# Patient Record
(demographics unavailable — no encounter records)

---

## 2024-10-15 NOTE — PHYSICAL EXAM
[FreeTextEntry1] : He is a 57 year old male in NAD  no erythema, warmth, swelling; shoulder hunched forward abd to 170 deg, ER to 90  Constitutional: healthy appearing, NAD, and normal body habitus  LUMBAR ROM: flexion to 30 deg, ext to 5 deg  Gait: normal  Inspection: no erythema, warmth Spine: no TTP in spinous process Bony palpation: no TTP in GT  Soft tissue palpation hip: no TTP in gluteus vin Soft tissue palpation of spine: no TTP in lumbar paraspinals  5/5 bilateral KE, DF, PF  sensation intact in bilat LE reflexes: ankle 2+  Special tests: neg seated SLR

## 2024-10-15 NOTE — ASSESSMENT
[FreeTextEntry1] : shoulder -  Educated that he needed to do HEP regularly.    lumbar - Discussed diagnosis and treatment plan including PT. Educated to do HEP regularly even if pain improves. lift things properly throw away old meds

## 2024-10-15 NOTE — DATA REVIEWED
[FreeTextEntry1] : office xrays of lumbar spine ap and lateral show mild scoliosis, mild DDD L3-S1, L4-S1 NF stenosis.

## 2024-10-15 NOTE — HISTORY OF PRESENT ILLNESS
[FreeTextEntry1] : Right shoulder is doing well.  Severity: 0/10 Duration: years Context: atraumatic Aggravating Factors: lifting arm Alleviating Factors: rest, injection Associated Symptoms: denies weight loss, fever, chills, change in bowel/bladder habits, weakness, numbness/tingling, radiation down arm Prior Studies: xray 10/2019 right subacromial injection - significant relief 2/2020 right   Location: back Severity: 5/10, worse lately Duration: years Context: atraumatic Aggravating Factors: sitting, bending Alleviating Factors: fetal position, FRANCO Associated Symptoms: denies weight loss, fever, chills, change in bowel/bladder habits, +weakness, +tingling, +radiation down left leg Prior Studies: EMG LE  Tried PT on [address] 6/2019 right L4 and L5 FRANCO - over 80% relief in thigh 7/2019 right L4 and L5 FRANCO - over 50% relief for 5 yr, less leg pain

## 2024-10-25 NOTE — PHYSICAL EXAM
[FreeTextEntry1] : He is a 57 year old male in NAD  no erythema, warmth, swelling; shoulder hunched forward abd to 170 deg, ER to 90  Constitutional: healthy appearing, NAD, and normal body habitus  LUMBAR ROM: flexion to 30 deg, ext to 5 deg  Gait: antalgic  Inspection: no erythema, warmth Spine: no TTP in spinous process Bony palpation: no TTP in GT  Soft tissue palpation hip: no TTP in gluteus vin Soft tissue palpation of spine: no TTP in lumbar paraspinals  5/5 bilateral PF and right KE, DF, 4 left KE, 4+ left DF sensation intact in bilat LE except decreased in left anterior leg knee reflex 1+ bilat  Special tests: neg seated SLR

## 2024-10-25 NOTE — HISTORY OF PRESENT ILLNESS
[FreeTextEntry1] : Location: back Severity: 8/10, worse lately Duration: years Context: atraumatic Aggravating Factors: sitting, bending Alleviating Factors: fetal position, FRANCO Associated Symptoms: denies weight loss, fever, chills, change in bowel/bladder habits, +weakness, +tingling, +radiation down left leg Prior Studies: EMG LE.  MRI 2024  Tried PT on [address] 6/2019 right L4 and L5 FRANCO - over 80% relief in thigh 7/2019 right L4 and L5 FRANCO - over 50% relief for 5 yr, less leg pain

## 2024-10-25 NOTE — ASSESSMENT
[FreeTextEntry1] : MRI report not back.  Images shows L4/5 and L5/S1 disc bulges and lateral recess stenosis.  lumbar - Discussed diagnosis and treatment plan including PT. Educated to do HEP regularly even if pain improves. lift things properly get up often on flight will give medrol in case he really needs it on trip and cannot get FRANCO before Tues.  will limit narcotics and use mostly for trip He has tried conservative tx including PT, nsaids for past 3 months without relief. Discussed FRANCO given weakness.  Risks include bleeding.    He will continue a comprehensive rehabilitation program afterward, as this is important to prevent recurrence of pain. increase gabapentin qhs to 2; still take 1 qam

## 2024-10-28 NOTE — PROCEDURE
[de-identified] : indication: pain       Fluoroscopically Guided, Contrast Enhanced, Left L4 and L5 Epidural Steroid Injection       After informed consent, He was placed prone on the fluoroscopy table. Skin over the area was prepped and draped in the usual sterile fashion before being anesthetized with 1% Lidocaine. Then using an oblique approach, a 22 gauge 5 in spinal needle was directed down to the L5/S1 NF.   Needle placement was confirmed with AP fluoroscopic images. After negative aspiration for blood or cerebrospinal fluid, contrast was instilled under live fluoroscopy and an epidurogram was obtained. It showed good epidural flow without any vascular uptake. Then a steroid solution consisting of 20 mg of Kenalog, 1 ml of 0.5% Lidocaine was instilled. The same procedure was repeated at the left L4/5 NF .       He  was discharged in good condition.  Instructions given: No soaking or bathing for 2 days but can take a shower.  No strenuous exercise for 4 days.       Manufacture [employer] Omnipaque 180 NDC 602147194 Exp 12/15/25 [id number]         Lidocaine Manufacture [employer] NDC 43466-266-01 Exp 9/25 LOT [id number]       Triamcinolone NDC [address]-1 Exp 11/25 Lot [id number] Manufacture [employer]

## 2024-10-28 NOTE — ASSESSMENT
[FreeTextEntry1] : He understands risk of immunosuppression from steroids.  Do not take medrol unless pain is severe.  It has been manageable with Tyl #3.  He had some immediate relief.  f/u after trip

## 2024-11-25 NOTE — ASSESSMENT
[FreeTextEntry1] : Taught DF ankle exercises.  He understands risk of immunosuppression from steroids.  Do not get covid vaccine for 2 wk.  f/u 2 wk.  He had some immediate relief.

## 2024-11-25 NOTE — PROCEDURE
[de-identified] : indication: pain       Fluoroscopically Guided, Contrast Enhanced, Left L4 and L5 Epidural Steroid Injection       After informed consent, He was placed prone on the fluoroscopy table. Skin over the area was prepped and draped in the usual sterile fashion before being anesthetized with 1% Lidocaine. Then using an oblique approach, a 22 gauge 5 in spinal needle was directed down to the L5/S1 NF.   Needle placement was confirmed with AP fluoroscopic images. After negative aspiration for blood or cerebrospinal fluid, contrast was instilled under live fluoroscopy and an epidurogram was obtained. It showed good epidural flow without any vascular uptake. Then a steroid solution consisting of 30 mg of Kenalog, 1 ml of 0.5% Lidocaine was instilled. The same procedure was repeated at the L4/5 NF.       He  was discharged in good condition.  Instructions given: No soaking or bathing for 2 days but can take a shower.  No strenuous exercise for 4 days.       Manufacture [employer] Omnipaque 180 NDC [id number] Exp 12/15/25 [id number]         Lidocaine Manufacture [employer] NDC 45623-627-04 Exp 9/25 LOT [id number]       Triamcinolone NDC [address]-9 Exp 11/25 Lot [id number] Manufacture [employer]

## 2024-12-12 NOTE — HISTORY OF PRESENT ILLNESS
[FreeTextEntry1] : Location: back Severity: 3/10 now, improving but still painful in afternoon.  Taking up to 5000 mg tylenol a day.  Duration: years Context: atraumatic Aggravating Factors: sitting, bending Alleviating Factors: fetal position, FRANCO Associated Symptoms: denies weight loss, fever, chills, change in bowel/bladder habits, +weakness, +numbness, +radiation down left leg Prior Studies: EMG LE.  MRI 2024  Tried PT on [address] 6/2019 right L4 and L5 FRANCO - over 80% relief in thigh 7/2019 right L4 and L5 FRANCO - over 50% relief for 5 yr, less leg pain 10/2024 left L4 and L5 FRANCO - over 50% relief, less numbness, off narcotics 11/2024 left L4 and L5 FRANCO -

## 2024-12-12 NOTE — ASSESSMENT
[FreeTextEntry1] : MRI show L4/5 recess stenosis and mild left NF stenosis, moderate left NF and recess stenosis at L5/S1.    lumbar - Discussed diagnosis and treatment plan including PT. Educated to do HEP regularly even if pain improves. lift things properly Another FRANCO needed given numbness and weakness.  Discussed surgery do ankle DF and KE to failure taking too much tylenol.  Educated on max dose of 3000 mg per day.  continue gabapentin.  Cannot increase due to sedation try swimming or aquatic therapy since has pool.  Try biking.  try acupuncture

## 2024-12-12 NOTE — PHYSICAL EXAM
[FreeTextEntry1] : He is a 57 year old male in NAD   Constitutional: healthy appearing, NAD, and normal body habitus  LUMBAR ROM: flexion to 30 deg, ext to 5 deg  Gait: antalgic, slow, small steps, poor balance  Inspection: no erythema, warmth Spine: no TTP in spinous process Bony palpation: no TTP in GT  Soft tissue palpation hip: no TTP in gluteus vin Soft tissue palpation of spine: no TTP in lumbar paraspinals  5/5 bilateral PF and right KE, DF, 4+ left KE, 4+ left DF sensation intact in bilat LE except decreased in left anterior and lateral leg  Special tests: neg seated SLR

## 2025-01-15 NOTE — HISTORY OF PRESENT ILLNESS
[FreeTextEntry1] : Location: back Severity: 6/10, painful in afternoon.   Duration: years Context: atraumatic Aggravating Factors: sitting, bending, walking Alleviating Factors: fetal position, FRANCO Associated Symptoms: denies weight loss, fever, chills, change in bowel/bladder habits, +weakness and tripping when walking, +numbness both legs now, +radiation down left leg and now right Prior Studies: EMG LE.  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 6/2019 right L4 and L5 FRANCO - over 80% relief in thigh 7/2019 right L4 and L5 FRANCO - over 50% relief for 5 yr, less leg pain 10/2024 left L4 and L5 FRANCO - over 50% relief, less numbness, off narcotics 11/2024 left L4 and L5 FRANCO - over 50% relief in am

## 2025-01-15 NOTE — PHYSICAL EXAM
[FreeTextEntry1] : He is a 57 year old male in NAD   Constitutional: healthy appearing, NAD, and normal body habitus  LUMBAR ROM: flexion to 30 deg, ext to 5 deg  Gait: antalgic, slow, small steps, poor balance  Inspection: no erythema, warmth Spine: no TTP in spinous process Bony palpation: no TTP in GT  Soft tissue palpation hip: no TTP in gluteus ivn Soft tissue palpation of spine: no TTP in lumbar paraspinals  5/5 bilateral PF and left KE, 5- right KE,  right DF 4, left 4+ sensation intact in bilat LE except decreased in left anterior and lateral leg  Special tests: neg seated SLR

## 2025-01-15 NOTE — ASSESSMENT
[FreeTextEntry1] : MRI show L4/5 recess stenosis and mild left NF stenosis, moderate left NF and recess stenosis at L5/S1.    lumbar - Discussed diagnosis and treatment plan including PT. Educated to do HEP regularly even if pain improves. lift things properly Another FRANCO needed given numbness and weakness.  Discussed surgery if not better.  do ankle DF and KE to failure Still taking too much tylenol.  Educated on max dose of 3000 mg per day. Will try tramadol next try horizant instead of gabapentin next 2 nights continue swimming or aquatic therapy since has pool.  Try biking.  try acupuncture

## 2025-03-17 NOTE — PROCEDURE
[de-identified] : indication: pain       Fluoroscopically Guided, Contrast Enhanced, Left L4 Epidural Steroid Injection       After informed consent, He was placed prone on the fluoroscopy table. Skin over the area was prepped and draped in the usual sterile fashion before being anesthetized with 1% Lidocaine. Then using an oblique approach, a 22 gauge 5 in spinal needle was directed down to the L4/5 NF.   Needle placement was confirmed with AP fluoroscopic images. After negative aspiration for blood or cerebrospinal fluid, contrast was instilled under live fluoroscopy and an epidurogram was obtained. It showed good epidural flow without any vascular uptake. Then a steroid solution consisting of 40 mg of Kenalog, 1 ml of 0.5% Lidocaine was instilled.        He  was discharged in good condition.  Instructions given: No soaking or bathing for 2 days but can take a shower.  No strenuous exercise for 4 days.       Manufacture [employer] Omnipaque 180 NDC [id number] Exp 12/15/25 [id number]         Lidocaine Manufacture [employer] NDC 00367-411-84 Exp 9/25 LOT [id number]       Triamcinolone NDC [address]-5 Exp 11/25 Lot [id number] Manufacture [employer]

## 2025-03-17 NOTE — ASSESSMENT
[FreeTextEntry1] : Right sided pain improved so only did left.  Left ankle strength improving but still weak.  Discussed surgery if not improved soon.  Reminded of tylenol limit per day. 4000 mg for short period but ideally 3000 mg.  Do not drink alcohol.  f/u 2 wk to eval weakness.  He had immediate pain relief.

## 2025-03-31 NOTE — HISTORY OF PRESENT ILLNESS
[FreeTextEntry1] : Location: back Severity: 6/10, painful in afternoon.   Duration: years Context: atraumatic Aggravating Factors: sitting, bending, walking Alleviating Factors: fetal position, FRANCO Associated Symptoms: denies weight loss, fever, chills, change in bowel/bladder habits, +weakness and tripping when walking, +numbness both legs now, +radiation down left leg and now right Prior Studies: EMG LE.  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  Tried PT on [address] 6/2019 right L4 and L5 FRANCO - over 80% relief in thigh 7/2019 right L4 and L5 FRANCO - over 50% relief for 5 yr, less leg pain 10/2024 left L4 and L5 FRANCO - over 50% relief, less numbness, off narcotics 11/2024 left L4 and L5 FRANCO - over 50% relief in am 3/2024 left L4 FRANCO - stronger in DF

## 2025-03-31 NOTE — ASSESSMENT
[FreeTextEntry1] : MRI show L4/5 recess stenosis and mild left NF stenosis, moderate left NF and recess stenosis at L5/S1.    lumbar - Discussed diagnosis and treatment plan including PT.  DF better on right and a bit better on left.  Discussed surgery since he is still weak and numb.  Do ankle DF and KE to failure. Did not get ankle weights yet.  Taught wall squats.  Do not to it every day - do it every other day. Educated he may need surgery if not better.  Still taking too much tylenol.  Sometimes takes 4000 mg per day.  Educated on max dose of 3000 mg per day. Will try tramadol next continue swimming or aquatic therapy since has pool.  Try biking.  try acupuncture avoid back ext  f/u 2 wk to eval numbness and weakness

## 2025-03-31 NOTE — PHYSICAL EXAM
[FreeTextEntry1] : He is a 57 year old male in NAD   Constitutional: healthy appearing, NAD, and normal body habitus  LUMBAR ROM: flexion to 30 deg, ext to 5 deg  Gait: antalgic, slow, small steps, poor balance  Inspection: no erythema, warmth Spine: no TTP in spinous process Bony palpation: no TTP in GT  Soft tissue palpation hip: no TTP in gluteus vin Soft tissue palpation of spine: no TTP in lumbar paraspinals  5/5 bilateral PF and left KE, 5- right KE,  right DF 5, left 4+ sensation intact in bilat LE except decreased in left anterior and lateral leg  Special tests: neg seated SLR

## 2025-04-11 NOTE — HISTORY OF PRESENT ILLNESS
[FreeTextEntry1] : Location: back Severity: 8/10 Duration: years Context: atraumatic Aggravating Factors: sitting, bending, walking Alleviating Factors: fetal position, FRANCO Associated Symptoms: denies weight loss, fever, chills, change in bowel/bladder habits, +weakness, +numbness left leg, +radiation down left leg  Prior Studies: EMG LE.  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 6/2019 right L4 and L5 FRANCO - over 80% relief in thigh 7/2019 right L4 and L5 FRANCO - over 50% relief for 5 yr, less leg pain 10/2024 left L4 and L5 FRANCO - over 50% relief, less numbness, off narcotics 11/2024 left L4 and L5 FRANCO - over 50% relief in am 3/2024 left L4 FRANCO - stronger in DF

## 2025-04-11 NOTE — PHYSICAL EXAM
[FreeTextEntry1] : He is a 57 year old male in NAD   Constitutional: healthy appearing, NAD, and normal body habitus  LUMBAR ROM: flexion to 30 deg, ext to 5 deg  Gait: slow, small steps, poor balance, feet close together  Inspection: no erythema, warmth Spine: no TTP in spinous process Bony palpation: no TTP in GT  Soft tissue palpation hip: no TTP in gluteus vin Soft tissue palpation of spine: no TTP in lumbar paraspinals  5/5 bilateral PF and left KE, 5 right KE,  right DF 5, left 5- but shaky sensation intact in bilat LE except decreased in left anterior lower leg and lateral thigh  Special tests: neg seated SLR

## 2025-04-11 NOTE — DATA REVIEWED
[FreeTextEntry1] : office xrays of lumbar spine ap and lateral show mild scoliosis, mild DDD L3-S1, L4-S1 NF stenosis.  
Induced

## 2025-04-11 NOTE — ASSESSMENT
[FreeTextEntry1] : MRI show L4/5 recess stenosis and mild left NF stenosis, moderate left NF and recess stenosis at L5/S1.    lumbar - Discussed diagnosis and treatment plan including PT.  DF better on right and a bit better on left.  Discussed surgery since he is still weak and numb.  Do ankle DF and KE to failure. Did not get ankle weights yet.  Taught wall squats.  Do not do it every day - do it every other day. Educated he may need surgery if not better.  reminded to not take too much tylenol.   Educated on max dose of ibuprofen per day. Consider tramadol next continue swimming or aquatic therapy since has pool.  tried acupuncture twice consider new bed avoid back ext corrected gait  sent to see Dr Hinojosa since not better in terms of pain.  DF is better.

## 2025-04-17 NOTE — HISTORY OF PRESENT ILLNESS
[de-identified] : 58 y/o male presenting with worsening chronic lower back pain. In 2019, patient felt pain radiating into right leg. He has 100% relief with lumbar epidural. However, pain returned 1 year ago, this time affecting his entire left leg. He feels sustained numbness and weakness in his left leg. He gets only partial relief with epidurals. He is doing PT with marginal relief. He manages his pain with meloxicam, tylenol, acetaminophen-codeine, and gabapentin. He is referred by Dr. Sanchez to discuss surgical options. He denies recent illness, fevers, balance problems, saddle anesthesia, urinary retention or fecal incontinence.

## 2025-04-17 NOTE — ASSESSMENT
[FreeTextEntry1] : 56 y/o male presenting with worsening chronic lower back pain. In 2019, patient felt pain radiating into right leg. He has 100% relief with lumbar epidural. However, pain returned 1 year ago, this time affecting his entire left leg. He feels sustained numbness and weakness in his left leg. He gets only partial relief with epidurals. He is doing PT with marginal relief. He manages his pain with meloxicam, tylenol, acetaminophen-codeine, and gabapentin. He is referred by Dr. Sanchez to discuss surgical options. He denies recent illness, fevers, balance problems, saddle anesthesia, urinary retention or fecal incontinence. I independently reviewed his MRI which shows degenerative changes with severe lateral recess stenosis and foraminal stenosis L4-S1.  We discussed treatment options including physical therapy, medications, spinal injections and surgery.  Surgery would be L4-S1 TLIF. We discussed the risks and benefits.  He understands the risks. He asked several great questions all of which were answered. He will be scheduled for surgery. He can continue gabapentin and codeine.  Continue PT. We discussed red flag symptoms that would require emergent evaluation. He knows to call with any questions or concerns or if his symptoms acutely worsen.

## 2025-04-17 NOTE — PHYSICAL EXAM
[de-identified] : General: No acute distress, conversant, well-nourished. Head: Normocephalic, atraumatic Neck: trachea midline, FROM Heart: normotensive and normal rate and rhythm Lungs: No labored breathing Skin: No abrasions, no rashes, no edema Psych: Alert and oriented to person, place and time Extremities: no peripheral edema or digital cyanosis Gait: Normal gait. Can perform tandem gait.   Vascular: warm and well perfused distally, palpable distal pulses MSK: Lumbar spine: No tenderness to palpation.  No step-off, no deformity.  NEURO EXAM: Sensation  Left L2  -  2/2             Left L3  -  2/2 Left L4  -  2/2 Left L5  -  2/2 Left S1  -  2/2  Right L2  -  2/2             Right L3  -  2/2 Right L4  -  2/2 Right L5  -  2/2 Right S1  -  2/2  Motor:  Left L2 (hip flexion)                            5/5                 Left L3 (knee extension)                   5/5                 Left L4 (ankle dorsiflexion)                 5/5                 Left L5 (long toe extensor)                5/5                 Left S1 (ankle plantar flexion)           5/5  Right L2 (hip flexion)                            5/5                 Right L3 (knee extension)                   5/5                 Right L4 (ankle dorsiflexion)                 5/5                 Right L5 (long toe extensor)                5/5                 Right S1 (ankle plantar flexion)           5/5  Reflexes: Normal and symmetric Negative clonus.  Down-going Babinski.    [de-identified] : I ordered radiographs to evaluate the patient's symptoms. Lumbar 4 view radiographs taken in the office today show no dislocation or fracture.  Lumbar spondylosis.  Transitional anatomy.  I independently reviewed his MRI which shows degenerative changes with severe lateral recess stenosis and foraminal stenosis L4-S1.

## 2025-05-01 NOTE — ASSESSMENT
[Patient Optimized for Surgery] : Patient optimized for surgery [No Further Testing Recommended] : no further testing recommended [As per surgery] : as per surgery [FreeTextEntry4] : If his labs and EKG are within normal limits, he is a low risk for low to moderate risk procedure.  He was advised to stop taking meloxicam a week prior to the procedure, he can continue rest of his medications.

## 2025-05-01 NOTE — HISTORY OF PRESENT ILLNESS
[No Pertinent Cardiac History] : no history of aortic stenosis, atrial fibrillation, coronary artery disease, recent myocardial infarction, or implantable device/pacemaker [No Pertinent Pulmonary History] : no history of asthma, COPD, sleep apnea, or smoking [No Adverse Anesthesia Reaction] : no adverse anesthesia reaction in self or family member [(Patient denies any chest pain, claudication, dyspnea on exertion, orthopnea, palpitations or syncope)] : Patient denies any chest pain, claudication, dyspnea on exertion, orthopnea, palpitations or syncope [Good (7-10 METs)] : Good (7-10 METs) [Chronic Anticoagulation] : no chronic anticoagulation [Chronic Kidney Disease] : no chronic kidney disease [Diabetes] : no diabetes [FreeTextEntry1] : L4-S1 TLIF [FreeTextEntry2] : 5/12/2025 [FreeTextEntry3] : Dr. Hinojosa [FreeTextEntry4] : RCRI - Class 1 risk - 3.9% 30 day risk of death, MI or cardiac arrest [facility] [place] operative risk - .1% risk of MI or cardiac arrest, intraoperatively or upto 30 days post op METS>4

## 2025-06-04 NOTE — HISTORY OF PRESENT ILLNESS
[0] : no pain reported [Chills] : no chills [Constipation] : no constipation [Diarrhea] : no diarrhea [Dysuria] : no dysuria [Fever] : no fever [Nausea] : no nausea [Vomiting] : no vomiting [de-identified] : post op s/p L4-S1 TLIF (5/19/2025) [de-identified] : 56 y/o male presenting for post op f/u L4-S1 TLIF (5/19/2025). Patient reports excellent relief in radicular symptoms. He reports minimal pain today. He is taking tylenol PRN now.  [de-identified] : MSK: Lumbar spine: Incision well healed  NEURO EXAM: Sensation  Left L2  -  2/2             Left L3  -  2/2 Left L4  -  2/2 Left L5  -  2/2 Left S1  -  2/2  Right L2  -  2/2             Right L3  -  2/2 Right L4  -  2/2 Right L5  -  2/2 Right S1  -  2/2  Motor:  Left L2 (hip flexion)                            5/5                 Left L3 (knee extension)                   5/5                 Left L4 (ankle dorsiflexion)                 5/5                 Left L5 (long toe extensor)                5/5                 Left S1 (ankle plantar flexion)           5/5  Right L2 (hip flexion)                            5/5                 Right L3 (knee extension)                   5/5                 Right L4 (ankle dorsiflexion)                 5/5                 Right L5 (long toe extensor)                5/5                 Right S1 (ankle plantar flexion)           5/5 [de-identified] : I ordered radiographs to evaluate the patient's symptoms. Lumbar 2 view radiographs taken in the office today show no dislocation or fracture.  s/p L4-S1 TLIF with hardware in appropriate position and alignment [de-identified] : 56 y/o male presenting for post op f/u L4-S1 TLIF (5/19/2025).  [de-identified] : Patient reports excellent relief in radicular symptoms. He reports minimal pain today. He is taking tylenol PRN now. Continue no lifting > 10 lbs, twisting or bending. F/U 4 weeks. We discussed red flag symptoms that would require emergent evaluation. He knows to call with any questions or concerns or if his symptoms acutely worsen.

## 2025-07-09 NOTE — HISTORY OF PRESENT ILLNESS
[0] : no pain reported [Chills] : no chills [Constipation] : no constipation [Diarrhea] : no diarrhea [Dysuria] : no dysuria [Fever] : no fever [Nausea] : no nausea [Vomiting] : no vomiting [de-identified] : post op s/p L4-S1 TLIF (5/19/2025) [de-identified] : 56 y/o male presenting for post op f/u L4-S1 TLIF (5/19/2025). Patient reports excellent relief in radicular symptoms. Denies pain. He denies radicular pain, recent illness, fevers, numbness, weakness, balance problems, saddle anesthesia, urinary retention or fecal incontinence.  [de-identified] : MSK: Lumbar spine: Incision well healed  NEURO EXAM: Sensation  Left L2  -  2/2             Left L3  -  2/2 Left L4  -  2/2 Left L5  -  2/2 Left S1  -  2/2  Right L2  -  2/2             Right L3  -  2/2 Right L4  -  2/2 Right L5  -  2/2 Right S1  -  2/2  Motor:  Left L2 (hip flexion)                            5/5                 Left L3 (knee extension)                   5/5                 Left L4 (ankle dorsiflexion)                 5/5                 Left L5 (long toe extensor)                5/5                 Left S1 (ankle plantar flexion)           5/5  Right L2 (hip flexion)                            5/5                 Right L3 (knee extension)                   5/5                 Right L4 (ankle dorsiflexion)                 5/5                 Right L5 (long toe extensor)                5/5                 Right S1 (ankle plantar flexion)           5/5 [de-identified] : I ordered radiographs to evaluate the patient's symptoms. Lumbar 2 view radiographs taken in the office today show no dislocation or fracture.  s/p L4-S1 TLIF with hardware in appropriate position and alignment [de-identified] : 56 y/o male presenting for post op f/u L4-S1 TLIF (5/19/2025).  [de-identified] : Denies pain. Neuro intact.  Discussed return to regular activity. F/U 3-4 months. We discussed red flag symptoms that would require emergent evaluation. He knows to call with any questions or concerns or if his symptoms acutely worsen.